# Patient Record
Sex: MALE | Race: WHITE | Employment: FULL TIME | URBAN - NONMETROPOLITAN AREA
[De-identification: names, ages, dates, MRNs, and addresses within clinical notes are randomized per-mention and may not be internally consistent; named-entity substitution may affect disease eponyms.]

---

## 2020-01-01 ENCOUNTER — OFFICE VISIT (OUTPATIENT)
Dept: FAMILY MEDICINE CLINIC | Age: 35
End: 2020-01-01
Payer: MEDICAID

## 2020-01-01 ENCOUNTER — HOSPITAL ENCOUNTER (OUTPATIENT)
Dept: GENERAL RADIOLOGY | Age: 35
Discharge: HOME | End: 2020-11-16
Payer: MEDICAID

## 2020-01-01 VITALS
TEMPERATURE: 98.8 F | WEIGHT: 246 LBS | BODY MASS INDEX: 31.57 KG/M2 | HEART RATE: 109 BPM | RESPIRATION RATE: 20 BRPM | OXYGEN SATURATION: 99 % | HEIGHT: 74 IN | SYSTOLIC BLOOD PRESSURE: 108 MMHG | DIASTOLIC BLOOD PRESSURE: 64 MMHG

## 2020-01-01 VITALS
WEIGHT: 230 LBS | OXYGEN SATURATION: 99 % | BODY MASS INDEX: 28.6 KG/M2 | HEIGHT: 75 IN | RESPIRATION RATE: 20 BRPM | TEMPERATURE: 96.6 F | DIASTOLIC BLOOD PRESSURE: 88 MMHG | SYSTOLIC BLOOD PRESSURE: 138 MMHG | HEART RATE: 76 BPM

## 2020-01-01 VITALS
SYSTOLIC BLOOD PRESSURE: 118 MMHG | OXYGEN SATURATION: 98 % | BODY MASS INDEX: 31.17 KG/M2 | WEIGHT: 256 LBS | TEMPERATURE: 97.1 F | HEIGHT: 76 IN | DIASTOLIC BLOOD PRESSURE: 70 MMHG | HEART RATE: 116 BPM

## 2020-01-01 PROCEDURE — 72100 X-RAY EXAM L-S SPINE 2/3 VWS: CPT

## 2020-01-01 PROCEDURE — 99214 OFFICE O/P EST MOD 30 MIN: CPT | Performed by: FAMILY MEDICINE

## 2020-01-01 PROCEDURE — 80305 DRUG TEST PRSMV DIR OPT OBS: CPT | Performed by: FAMILY MEDICINE

## 2020-01-01 RX ORDER — TRIAMCINOLONE ACETONIDE 40 MG/ML
80 INJECTION, SUSPENSION INTRA-ARTICULAR; INTRAMUSCULAR ONCE
Status: COMPLETED | OUTPATIENT
Start: 2020-01-01 | End: 2020-01-01

## 2020-01-01 RX ORDER — LISINOPRIL 20 MG/1
20 TABLET ORAL DAILY
Qty: 30 TABLET | Refills: 5 | Status: SHIPPED | OUTPATIENT
Start: 2020-01-01

## 2020-01-01 RX ORDER — TIZANIDINE 4 MG/1
4 TABLET ORAL 3 TIMES DAILY
Qty: 45 TABLET | Refills: 0 | Status: SHIPPED | OUTPATIENT
Start: 2020-01-01 | End: 2020-01-01 | Stop reason: ALTCHOICE

## 2020-01-01 RX ORDER — QUETIAPINE FUMARATE 50 MG/1
TABLET, FILM COATED ORAL
Qty: 30 TABLET | Refills: 1 | Status: SHIPPED | OUTPATIENT
Start: 2020-01-01 | End: 2020-01-01 | Stop reason: SDUPTHER

## 2020-01-01 RX ORDER — CLONAZEPAM 1 MG/1
1 TABLET ORAL 3 TIMES DAILY PRN
Qty: 80 TABLET | Refills: 1 | Status: SHIPPED | OUTPATIENT
Start: 2020-01-01 | End: 2020-01-01 | Stop reason: SDUPTHER

## 2020-01-01 RX ORDER — CLONAZEPAM 1 MG/1
1 TABLET ORAL 3 TIMES DAILY PRN
Qty: 80 TABLET | Refills: 2 | Status: SHIPPED | OUTPATIENT
Start: 2020-01-01 | End: 2020-01-01 | Stop reason: SDUPTHER

## 2020-01-01 RX ORDER — DICLOFENAC SODIUM 75 MG/1
75 TABLET, DELAYED RELEASE ORAL 2 TIMES DAILY
Qty: 60 TABLET | Refills: 3 | Status: SHIPPED | OUTPATIENT
Start: 2020-01-01

## 2020-01-01 RX ORDER — QUETIAPINE FUMARATE 50 MG/1
TABLET, FILM COATED ORAL
Qty: 30 TABLET | Refills: 2 | Status: SHIPPED | OUTPATIENT
Start: 2020-01-01 | End: 2020-01-01 | Stop reason: SDUPTHER

## 2020-01-01 RX ORDER — TIZANIDINE 4 MG/1
4 TABLET ORAL 3 TIMES DAILY
Qty: 60 TABLET | Refills: 0 | Status: SHIPPED | OUTPATIENT
Start: 2020-01-01

## 2020-01-01 RX ORDER — CLONAZEPAM 1 MG/1
1 TABLET ORAL 3 TIMES DAILY PRN
Qty: 75 TABLET | Refills: 2 | Status: SHIPPED | OUTPATIENT
Start: 2020-01-01 | End: 2020-01-01

## 2020-01-01 RX ORDER — QUETIAPINE FUMARATE 50 MG/1
TABLET, FILM COATED ORAL
Qty: 30 TABLET | Refills: 2 | Status: SHIPPED | OUTPATIENT
Start: 2020-01-01

## 2020-01-01 RX ORDER — LISINOPRIL 20 MG/1
20 TABLET ORAL DAILY
Qty: 30 TABLET | Refills: 5 | Status: SHIPPED | OUTPATIENT
Start: 2020-01-01 | End: 2020-01-01 | Stop reason: SDUPTHER

## 2020-01-01 RX ADMIN — TRIAMCINOLONE ACETONIDE 80 MG: 40 INJECTION, SUSPENSION INTRA-ARTICULAR; INTRAMUSCULAR at 07:50

## 2020-01-01 ASSESSMENT — ENCOUNTER SYMPTOMS
SHORTNESS OF BREATH: 0
WHEEZING: 0
BACK PAIN: 0
VOMITING: 0
WHEEZING: 0
EYE DISCHARGE: 0
CONSTIPATION: 0
RHINORRHEA: 0
COUGH: 0
DIARRHEA: 0
COUGH: 0
NAUSEA: 0
ABDOMINAL PAIN: 0
BACK PAIN: 1
EYE DISCHARGE: 0
RHINORRHEA: 0
ABDOMINAL PAIN: 0
RHINORRHEA: 0
CONSTIPATION: 0
VOMITING: 0
EYE PAIN: 0
DIARRHEA: 0
NAUSEA: 0
ABDOMINAL PAIN: 0
SHORTNESS OF BREATH: 0
NAUSEA: 0
EYE DISCHARGE: 0
DIARRHEA: 0
EYE PAIN: 0
SHORTNESS OF BREATH: 0
WHEEZING: 0
CONSTIPATION: 0
EYE PAIN: 0
BACK PAIN: 0
VOMITING: 0
COUGH: 0

## 2020-05-13 ENCOUNTER — OFFICE VISIT (OUTPATIENT)
Dept: FAMILY MEDICINE CLINIC | Age: 35
End: 2020-05-13
Payer: MEDICAID

## 2020-05-13 VITALS
DIASTOLIC BLOOD PRESSURE: 94 MMHG | BODY MASS INDEX: 33.24 KG/M2 | RESPIRATION RATE: 20 BRPM | SYSTOLIC BLOOD PRESSURE: 152 MMHG | OXYGEN SATURATION: 99 % | HEIGHT: 76 IN | WEIGHT: 273 LBS | TEMPERATURE: 96.9 F | HEART RATE: 86 BPM

## 2020-05-13 PROCEDURE — 99204 OFFICE O/P NEW MOD 45 MIN: CPT | Performed by: FAMILY MEDICINE

## 2020-05-13 RX ORDER — CLONAZEPAM 1 MG/1
1 TABLET ORAL 3 TIMES DAILY PRN
Qty: 90 TABLET | Refills: 0 | Status: SHIPPED | OUTPATIENT
Start: 2020-05-13 | End: 2020-01-01 | Stop reason: SDUPTHER

## 2020-05-13 RX ORDER — PROPRANOLOL HYDROCHLORIDE 20 MG/1
TABLET ORAL
COMMUNITY
Start: 2020-02-20 | End: 2020-05-13

## 2020-05-13 RX ORDER — QUETIAPINE FUMARATE 50 MG/1
TABLET, FILM COATED ORAL
COMMUNITY
Start: 2020-02-20 | End: 2020-01-01 | Stop reason: SDUPTHER

## 2020-05-13 RX ORDER — LISINOPRIL 20 MG/1
20 TABLET ORAL DAILY
Qty: 30 TABLET | Refills: 1 | Status: SHIPPED | OUTPATIENT
Start: 2020-05-13 | End: 2020-01-01 | Stop reason: SDUPTHER

## 2020-05-13 ASSESSMENT — ENCOUNTER SYMPTOMS
DIARRHEA: 0
WHEEZING: 0
COUGH: 0
BACK PAIN: 0
SHORTNESS OF BREATH: 0
NAUSEA: 0
ABDOMINAL PAIN: 0
EYE DISCHARGE: 0
VOMITING: 0
CONSTIPATION: 0
RHINORRHEA: 0
EYE PAIN: 0

## 2020-05-13 ASSESSMENT — PATIENT HEALTH QUESTIONNAIRE - PHQ9
2. FEELING DOWN, DEPRESSED OR HOPELESS: 0
SUM OF ALL RESPONSES TO PHQ QUESTIONS 1-9: 0
1. LITTLE INTEREST OR PLEASURE IN DOING THINGS: 0
SUM OF ALL RESPONSES TO PHQ QUESTIONS 1-9: 0
SUM OF ALL RESPONSES TO PHQ9 QUESTIONS 1 & 2: 0

## 2020-05-13 NOTE — PROGRESS NOTES
Sekou Lucas is a 36 y.o. male who presents today for   Chief Complaint   Patient presents with    New Patient     to establish care . blood pressure and anxiety . takes inderal but it makes him sleepy , wore out        Chief Complaint: Establish care    HPI  Patient reports that he has had issues with anxiety for several years and states that for a while he was able to just deal with his anxiety but more recently has been having more problems. He states that in years past he was on Klonopin and was doing fairly well with him but was taken off of them by his previous PCP and replaced with other medications. He states that the other medications were not really providing him much benefit however he had reached the point where he was doing better and was no longer needing the medicine as he was able to do with it but more recently has been having a lot more problems with his anxiety and has found it more difficult to function and do the things that he needs to do. He states that he has been on other medications in the past but Klonopin seemed to do the best for him. He states that he was on Xanax for a little while but he seemed to think that that affected his memory. He was on BuSpar but that made him feel off and a little out of his head and did not like the way it made him feel. He also states that he has been on Paxil and Wellbutrin but neither of those really did well for him. He also has a history of arterial hypertension and is currently taking propranolol which she reports has done reasonably well for his blood pressure but makes him feel horrible. He states that he feels fatigued and wiped out when he takes it and for that reason he finds himself not taking it some because he cannot afford to fill that way. He states that the only other thing that he is ever been on was metoprolol and reports that just did not do as well for his blood pressure.   He denies any issues with his heart rate, issues with tablet Take 1 tablet by mouth daily 30 tablet 1    clonazePAM (KLONOPIN) 1 MG tablet Take 1 tablet by mouth 3 times daily as needed for Anxiety for up to 30 days. 90 tablet 0    ibuprofen (ADVIL;MOTRIN) 200 MG tablet Take 200 mg by mouth every 6 hours as needed for Pain. No current facility-administered medications for this visit. No Known Allergies    Past Surgical History:   Procedure Laterality Date    FACIAL RECONSTRUCTION SURGERY      TONSILLECTOMY         Social History     Tobacco Use    Smoking status: Current Every Day Smoker     Packs/day: 1.00     Types: Cigarettes     Start date: 2006    Smokeless tobacco: Never Used   Substance Use Topics    Alcohol use: No    Drug use: No       History reviewed. No pertinent family history. BP (!) 152/94 (Site: Right Upper Arm, Position: Sitting, Cuff Size: Large Adult) Comment: did not take medication today  Pulse 86   Temp 96.9 °F (36.1 °C) (Oral)   Resp 20   Ht 6' 4\" (1.93 m)   Wt 273 lb (123.8 kg)   SpO2 99%   BMI 33.23 kg/m²     Physical Exam  Vitals signs and nursing note reviewed. Constitutional:       General: He is not in acute distress. Appearance: Normal appearance. He is well-developed. He is not diaphoretic. HENT:      Head: Normocephalic and atraumatic. Right Ear: External ear normal.      Left Ear: External ear normal.      Nose:      Comments: Mask in place     Mouth/Throat:      Comments: Mask in place  Eyes:      General: No scleral icterus. Right eye: No discharge. Left eye: No discharge. Conjunctiva/sclera: Conjunctivae normal.   Neck:      Musculoskeletal: Normal range of motion and neck supple. No muscular tenderness. Trachea: No tracheal deviation. Cardiovascular:      Rate and Rhythm: Normal rate and regular rhythm. Heart sounds: Normal heart sounds. No murmur. No friction rub. No gallop. Pulmonary:      Effort: Pulmonary effort is normal. No respiratory distress. and the possibility of further adjustments moving forward. Discussed potential side effects with the medicine. We will continue to monitor and adjust course dictates. 4. Disturbance in sleep behavior G47.9  with patient doing well with Seroquel. We continue at this time and will continue to monitor. Discussed potential side effects with Seroquel and discussed the possibility of trials of alternative medications in the future once we have a better grasp on his anxiety and blood pressure to determine if there are other options I could do well for him that may not have some of the similar type side effects of Seroquel can. We will continue with the Seroquel and continue to monitor and further discuss at future visits. Plan:  Discussed proper use of medication. Discussed signs and symptoms requiring medical attention. All questions were answered and patient voiced understanding and agreement with plan as discussed. No orders of the defined types were placed in this encounter. Orders Placed This Encounter   Medications    lisinopril (PRINIVIL;ZESTRIL) 20 MG tablet     Sig: Take 1 tablet by mouth daily     Dispense:  30 tablet     Refill:  1    clonazePAM (KLONOPIN) 1 MG tablet     Sig: Take 1 tablet by mouth 3 times daily as needed for Anxiety for up to 30 days. Dispense:  90 tablet     Refill:  0     Medications Discontinued During This Encounter   Medication Reason    propranolol (INDERAL) 20 MG tablet      Patient Instructions   Patient given educational handouts and has had all questions answered. Patient voices understanding and agrees to plans along with risks and benefits of plan. Patient is instructed to continue prior meds,diet, and exercise plans as instructed. Patient agrees to follow up as instructed and sooner if needed. Patient agrees to go to ER if condition becomes emergent. Return in about 4 weeks (around 6/10/2020), or if symptoms worsen or fail to improve.

## 2020-06-11 NOTE — PROGRESS NOTES
Wendy Will is a 36 y.o. male who presents today for   Chief Complaint   Patient presents with    1 Month Follow-Up     tried new BP med     Anxiety    Hypertension       Chief Complaint: Anxiety    HPI  Patient has a history of anxiety and panic attacks and recently was started on trial of Klonopin. He reports that he did well with the medicine and denies any problems with his use. He states it has been helpful at controlling his anxiety without causing any problems. He states that without the medicine he has significant issues with his anxiety that interferes with his daily life. He does have a history of insomnia and has been doing well with his current use of Seroquel. He denies any issues with use of medicine denies any recent changes to his sleep. He also has essential hypertension and has been doing well with his blood pressure with use of lisinopril. He denies any issues with use of medicine or any symptoms from abnormal blood pressures. He does attempt to avoid excess salt intake. Review of Systems   Constitutional: Negative for activity change, appetite change, fatigue and fever. HENT: Negative for congestion and rhinorrhea. Eyes: Negative for pain and discharge. Respiratory: Negative for cough, shortness of breath and wheezing. Cardiovascular: Negative for chest pain and palpitations. Gastrointestinal: Negative for abdominal pain, constipation, diarrhea, nausea and vomiting. Endocrine: Negative for cold intolerance and heat intolerance. Genitourinary: Negative for dysuria and hematuria. Musculoskeletal: Positive for arthralgias. Negative for back pain, gait problem and neck pain. Skin: Negative for rash and wound. Neurological: Negative for syncope and weakness. Hematological: Negative for adenopathy. Does not bruise/bleed easily. Psychiatric/Behavioral: Positive for sleep disturbance. Negative for dysphoric mood. The patient is nervous/anxious.         Past Medical History:   Diagnosis Date    Anxiety     Hypertension        Current Outpatient Medications   Medication Sig Dispense Refill    clonazePAM (KLONOPIN) 1 MG tablet Take 1 tablet by mouth 3 times daily as needed for Anxiety for up to 30 days. 80 tablet 1    QUEtiapine (SEROQUEL) 50 MG tablet TAKE ONE TABLET BY MOUTH AT BEDTIME 30 tablet 1    lisinopril (PRINIVIL;ZESTRIL) 20 MG tablet Take 1 tablet by mouth daily 30 tablet 5    ibuprofen (ADVIL;MOTRIN) 200 MG tablet Take 200 mg by mouth every 6 hours as needed for Pain. No current facility-administered medications for this visit. No Known Allergies    Past Surgical History:   Procedure Laterality Date    FACIAL RECONSTRUCTION SURGERY      TONSILLECTOMY         Social History     Tobacco Use    Smoking status: Current Every Day Smoker     Packs/day: 1.00     Types: Cigarettes     Start date: 2006    Smokeless tobacco: Never Used   Substance Use Topics    Alcohol use: No    Drug use: No       No family history on file. /70 (Site: Right Upper Arm, Position: Sitting, Cuff Size: Large Adult)   Pulse 116   Temp 97.1 °F (36.2 °C) (Temporal)   Ht 6' 4\" (1.93 m)   Wt 256 lb (116.1 kg)   SpO2 98%   BMI 31.16 kg/m²     Physical Exam  Vitals signs and nursing note reviewed. Constitutional:       General: He is not in acute distress. Appearance: Normal appearance. He is well-developed. He is not diaphoretic. HENT:      Head: Normocephalic and atraumatic. Right Ear: External ear normal.      Left Ear: External ear normal.      Nose:      Comments: Mask in place     Mouth/Throat:      Comments: Mask in place  Eyes:      General: No scleral icterus. Right eye: No discharge. Left eye: No discharge. Conjunctiva/sclera: Conjunctivae normal.   Neck:      Musculoskeletal: Normal range of motion and neck supple. No muscular tenderness. Trachea: No tracheal deviation.    Cardiovascular:      Rate and Rhythm: Normal rate and regular rhythm. Heart sounds: Normal heart sounds. No murmur. No friction rub. No gallop. Pulmonary:      Effort: Pulmonary effort is normal. No respiratory distress. Breath sounds: Normal breath sounds. No wheezing or rales. Abdominal:      General: Bowel sounds are normal. There is no distension. Palpations: Abdomen is soft. Tenderness: There is no abdominal tenderness. Musculoskeletal:         General: No deformity (No gross deformities of upper or lower extremities) or signs of injury. Right lower leg: No edema. Left lower leg: No edema. Lymphadenopathy:      Cervical: No cervical adenopathy. Skin:     General: Skin is warm and dry. Findings: No erythema or rash. Neurological:      Mental Status: He is alert and oriented to person, place, and time. Cranial Nerves: No cranial nerve deficit. Psychiatric:         Behavior: Behavior normal.         Thought Content: Thought content normal.         Assessment:       ICD-10-CM    1. Anxiety F41.9 clonazePAM (KLONOPIN) 1 MG tablet   2. Panic attacks F41.0 clonazePAM (KLONOPIN) 1 MG tablet    Symptomatic improvement with use of Klonopin. Stressed importance of as needed use only. We will continue to monitor with efforts of reducing dose to the lowest possible amount to control his symptoms. 3. Insomnia, unspecified type G47.00 QUEtiapine (SEROQUEL) 50 MG tablet    Doing well with current use of Seroquel. Will continue continue to monitor. 4. Essential hypertension I10 lisinopril (PRINIVIL;ZESTRIL) 20 MG tablet    Controlled with current medication. We will continue to monitor and adjust course dictates. Plan:  Discussed proper use of medication. Discussed signs and symptoms requiring medical attention. All questions were answered and patient voiced understanding and agreement with plan as discussed. No orders of the defined types were placed in this encounter.     Orders Placed This Encounter   Medications    clonazePAM (KLONOPIN) 1 MG tablet     Sig: Take 1 tablet by mouth 3 times daily as needed for Anxiety for up to 30 days. Dispense:  80 tablet     Refill:  1    QUEtiapine (SEROQUEL) 50 MG tablet     Sig: TAKE ONE TABLET BY MOUTH AT BEDTIME     Dispense:  30 tablet     Refill:  1    lisinopril (PRINIVIL;ZESTRIL) 20 MG tablet     Sig: Take 1 tablet by mouth daily     Dispense:  30 tablet     Refill:  5     Medications Discontinued During This Encounter   Medication Reason    clonazePAM (KLONOPIN) 1 MG tablet REORDER    QUEtiapine (SEROQUEL) 50 MG tablet REORDER    lisinopril (PRINIVIL;ZESTRIL) 20 MG tablet REORDER     Patient Instructions   Patient given educational handouts and has had all questions answered. Patient voices understanding and agrees to plans along with risks and benefits of plan. Patient is instructed to continue prior meds,diet, and exercise plans as instructed. Patient agrees to follow up as instructed and sooner if needed. Patient agrees to go to ER if condition becomes emergent. Return in about 2 months (around 8/11/2020).

## 2020-07-07 NOTE — PATIENT INSTRUCTIONS
social groups, or volunteer to help others. Being alone sometimes makes things seem worse than they are. · Get at least 30 minutes of exercise on most days of the week to relieve stress. Walking is a good choice. You also may want to do other activities, such as running, swimming, cycling, or playing tennis or team sports. Relaxation techniques  Do relaxation exercises 10 to 20 minutes a day. You can play soothing, relaxing music while you do them, if you wish. · Tell others in your house that you are going to do your relaxation exercises. Ask them not to disturb you. · Find a comfortable place, away from all distractions and noise. · Lie down on your back, or sit with your back straight. · Focus on your breathing. Make it slow and steady. · Breathe in through your nose. Breathe out through either your nose or mouth. · Breathe deeply, filling up the area between your navel and your rib cage. Breathe so that your belly goes up and down. · Do not hold your breath. · Breathe like this for 5 to 10 minutes. Notice the feeling of calmness throughout your whole body. As you continue to breathe slowly and deeply, relax by doing the following for another 5 to 10 minutes:  · Tighten and relax each muscle group in your body. You can begin at your toes and work your way up to your head. · Imagine your muscle groups relaxing and becoming heavy. · Empty your mind of all thoughts. · Let yourself relax more and more deeply. · Become aware of the state of calmness that surrounds you. · When your relaxation time is over, you can bring yourself back to alertness by moving your fingers and toes and then your hands and feet and then stretching and moving your entire body. Sometimes people fall asleep during relaxation, but they usually wake up shortly afterward. · Always give yourself time to return to full alertness before you drive a car or do anything that might cause an accident if you are not fully alert.  Never play a relaxation tape while you drive a car. When should you call for help? [id number] anytime you think you may need emergency care. For example, call if:  · You feel you cannot stop from hurting yourself or someone else. Keep the numbers for these national suicide hotlines: 1-465-566-TALK (5-578.866.7184) and 1-731-TZYXMJD (8-911.641.7969). If you or someone you know talks about suicide or feeling hopeless, get help right away. Watch closely for changes in your health, and be sure to contact your doctor if:  · You have anxiety or fear that affects your life. · You have symptoms of anxiety that are new or different from those you had before. Where can you learn more? Go to https://[employer]. org and sign in to your [employer] account. Enter P754 in the Xeebel box to learn more about \"Anxiety Disorder: Care Instructions. \"     If you do not have an account, please click on the \"Sign Up Now\" link. Current as of: January 31, 2020               Content Version: 12.5  © 3389-7552 [employer]. Care instructions adapted under license by [facility] ([facility]). If you have questions about a medical condition or this instruction, always ask your healthcare professional. [address] any warranty or liability for your use of this information.

## 2020-08-13 NOTE — PATIENT INSTRUCTIONS

## 2020-08-13 NOTE — LETTER
MEDICATION AGREEMENT     [facility]  [birth year]26      For certain conditions, multiple classes of medications may be used to help better manage your symptoms, and to improve your ability to function at home, work and in social settings. However, these medications do have risks, which will be discussed with you, including addiction and dependency. The following prescribed medications need frequent monitoring and will require you to partner and assist in your healthcare. Medication  Dose, instructions and quantity as indicated on current prescription bottle Diagnosis/Reason(s) for Taking Category     Klonopin 1 mg 1 tid prn #80 1 refill Anxiety                             Benefits and goals of Controlled Substance Medications: There are two potential goals for your treatment: (1) decreased pain and suffering (2) improved daily life functions. There are many possible treatments for your chronic condition(s), and, in addition to controlled substance medications, we will try alternatives such as physical therapy, yoga, massage, home daily exercise, meditation, relaxation techniques, injections, chiropractic manipulations, surgery, cognitive therapy, hypnosis and many medications that are not habit-forming. Use of controlled substance medications may be helpful, but they are unlikely to resolve all of your symptoms or restore all function. Risks of Controlled Substance Medications:    Opioid pain medications: These medications can lead to problems such as addiction/dependence, sedation, lightheadedness/dizziness, memory issues, falls, constipation, nausea, or vomiting. They may also impair the ability to drive or operate machinery. Additionally, these medications may lower testosterone levels, leading to loss of bone strength, stamina and sex drive.   They may cause problems with breathing, sleep apnea and reduced coughing, which are especially dangerous for patients with lung disease. Overdose or dangerous interactions with alcohol and other medications may occur, leading to death. Hyperalgesia may develop, in which patients receiving opioids for the treatment of pain may actually become more sensitive to certain painful stimuli, and in some cases, experience pain from ordinarily non-painful stimuli. Women between the ages of 14-53 who could become pregnant should carefully weigh the risks and benefits of opioids with their physicians, as these medications increase the risk of pregnancy complications, including miscarriage,  delivery and stillbirth. It is also possible for babies to be born addicted to opioids. Opioid dependence withdrawal symptoms may include; feelings of uneasiness, increased pain, irritability, belly pain, diarrhea, sweats and goose-flesh. Benzodiazepines and non-benzodiazepine sleep medications: These medications can lead to problems such as addiction/dependence, sedation, fatigue, lightheadedness, dizziness, incoordination, falls, depression, hallucinations, and impaired judgment, memory and concentration. The ability to drive and operate machinery may also be affected. Abnormal sleep-related behaviors have been reported, including sleep walking, driving, making telephone calls, eating, or having sex while not fully awake. These medications can suppress breathing and worsen sleep apnea, particularly when combined with alcohol or other sedating medications, potentially leading to death. Dependence withdrawal symptoms may include tremors, anxiety, hallucinations and seizures. Stimulants:  Common adverse effects include addiction/dependence, increased blood pressure and heart rate, decreased appetite, nausea, involuntary weight loss, insomnia, irritability, and headaches.   These risks may increase when these medications are combined with other stimulants, such as caffeine pills or energy drinks, certain weight loss supplements and oral decongestants. Dependence withdrawal symptoms may include depressed mood, loss of interest, suicidal thoughts, anxiety, fatigue, appetite changes and agitation. Testosterone replacement therapy:  Potential side effects include increased risk of stroke and heart attack, blood clots, increased blood pressure, increased cholesterol, enlarged prostate, sleep apnea, irritability/aggression and other mood disorders, and decreased fertility. Other:     1. I understand that I have the following responsibilities:  · I will take medications at the dose and frequency prescribed. · I will not increase or change how I take my medications without the approval of the health care provider who signs this Medication Agreement. · I will arrange for refills at the prescribed interval ONLY during regular office hours. I will not ask for refills earlier than agreed, after-hours, on holidays or on weekends. · I will obtain all refills for these medications at  ·  ____________________________________  pharmacy (phone number  ·  ________________________), with full consent for my provider and pharmacist to exchange information in writing or verbally. · I will not request any pain medications or controlled substances from other providers and will inform this provider of all other medications I am taking. · I will inform my other health care providers that I am taking these medications and of the existence of this [address]. In the event of an emergency, I will provide the same information to the emergency department providers. · I will protect my prescriptions and medications. I understand that lost or misplaced prescriptions will not be replaced. · I will keep medications only for my own use and will not share them with others. I will keep all medications away from children. · I agree to participate in any medical, psychological or psychiatric assessments recommended by my provider. · I will actively participate in any program designed to improve function, including social, physical, psychological and daily or work activities. 2. I will not use illegal or street drugs or another person's prescription. If I have an addiction problem with drugs or alcohol and my provider asks me to enter a program to address this issue, I agree to follow through. Such programs may include:  · 12-Step program and securing a sponsor  · Individual counseling   · Inpatient or outpatient treatment  · Other:_____________________________________________________________________________________________________________________________________________    If in treatment, I will request that a copy of the programs initial evaluation and treatment recommendations be sent to this provider and will not expect refills until that is received. I will also request written monthly updates be sent to this provider to verify my continuing treatment. 3. I will consent to drug screening upon my providers request to assure I am only taking the prescribed drugs, described in this MEDICATION AGREEMENT. I understand that a drug screen is a laboratory test in which a sample of my urine, blood or saliva is checked to see what drugs I have been taking. 4. I agree that I will treat the providers and staff at this office with respect at all times. I will keep all of my scheduled appointments, but if I need to cancel my appointment, I will do so a minimum of 24 hours before it is scheduled. 5. I understand that this provider may stop prescribing the medications listed if:  · I do not show any improvement in pain, or my activity has not improved. · I develop rapid tolerance or loss of improvement, as described in my treatment plan. · I develop significant side effects from the medication.   · My behavior is inconsistent with the responsibilities outlined above, which may also result in my being prevented from receiving further care from this office. · Other:____________________________________________________________________    AGREEMENT:    I have read the above and have had all of my questions answered. For chronic disease management, I know that my symptoms can be managed with many types of treatments. A chronic medication trial may be part of my treatment, but I must be an active participant in my care. Medication therapy is only one part of my symptom management plan. In some cases, there may be limited scientific evidence to support the chronic use of certain medications to improve symptoms and daily function. Furthermore, in certain circumstances, there may be scientific information that suggests that use of chronic controlled substances may actually worsen my symptoms and increase my risk of unintentional death directly related to this medication therapy. I know that if my provider feels my risk from controlled medications is greater than my benefit, I will have my controlled substance medication(s) compassionately lowered or removed altogether. I agree to a controlled substance medication trial.      I further agree to allow this office to contact my HIPAA contact on file if there are concerns about my safety and use of controlled medications. I have agreed to use the following medications above as instructed by my physician and as stated in this [address].      Patient Signature:  ______________________  Date:8/13/2020 or _____________    Provider Signature:______________________  Date:8/13/2020 or _____________

## 2020-08-13 NOTE — PROGRESS NOTES
Suzette Harrington is a 36 y.o. male who presents today for   Chief Complaint   Patient presents with    Other     2 month follow up    Medication Refill     last taking this am    Anxiety    Depression    Hypertension       Chief Complaint: follow up    HPI  Has a new job and is doing well and enjoying it but is working a lot. He does have a history of anxiety and panic attacks and is doing well with his use of klonopin. He states that he has not had any issues with the medicine and trying to be very diligent at avoid regular use but is still taking about 2 per day and some days 3 per day. Blood pressure doing well with the lisinopril. He denies any issues with the medicine or any symptoms from abnormal blood pressures. He does attempt to avoid excess salt intake. Seroquel continues to do well for him. He states that he is sleeping well. He states that with the new job he has been sleeping better and at times has been able to cut back on the use of Seroquel or take a half but states that it still helpful and denies any problems with the medicine. He does report that he is been having some right upper back/shoulder pain around his scapula. He reports that he is been given problems for years but with his new job has aggravated some. He is managing but denies any specific aggravating or relieving factors for symptoms. He is not been do anything in efforts to improve his symptoms. Review of Systems   Constitutional: Negative for activity change, appetite change, fatigue and fever. HENT: Negative for congestion and rhinorrhea. Eyes: Negative for pain and discharge. Respiratory: Negative for cough, shortness of breath and wheezing. Cardiovascular: Negative for chest pain and palpitations. Gastrointestinal: Negative for abdominal pain, constipation, diarrhea, nausea and vomiting. Endocrine: Negative for cold intolerance and heat intolerance.    Genitourinary: Negative for dysuria and hematuria. Musculoskeletal: Positive for arthralgias. Negative for back pain, gait problem and neck pain. Skin: Negative for rash and wound. Neurological: Negative for syncope and weakness. Hematological: Negative for adenopathy. Does not bruise/bleed easily. Psychiatric/Behavioral: Positive for sleep disturbance. Negative for dysphoric mood. The patient is nervous/anxious. Past Medical History:   Diagnosis Date    Anxiety     Hypertension        Current Outpatient Medications   Medication Sig Dispense Refill    clonazePAM (KLONOPIN) 1 MG tablet Take 1 tablet by mouth 3 times daily as needed for Anxiety for up to 30 days. 80 tablet 2    QUEtiapine (SEROQUEL) 50 MG tablet TAKE ONE TABLET BY MOUTH AT BEDTIME 30 tablet 2    lisinopril (PRINIVIL;ZESTRIL) 20 MG tablet Take 1 tablet by mouth daily 30 tablet 5    tiZANidine (ZANAFLEX) 4 MG tablet Take 1 tablet by mouth 3 times daily 45 tablet 0    ibuprofen (ADVIL;MOTRIN) 200 MG tablet Take 200 mg by mouth every 6 hours as needed for Pain. No current facility-administered medications for this visit. No Known Allergies    Past Surgical History:   Procedure Laterality Date    FACIAL RECONSTRUCTION SURGERY      TONSILLECTOMY         Social History     Tobacco Use    Smoking status: Current Every Day Smoker     Packs/day: 1.00     Types: Cigarettes     Start date: 2006    Smokeless tobacco: Never Used   Substance Use Topics    Alcohol use: No    Drug use: No       No family history on file. /64 (Site: Right Upper Arm, Position: Sitting, Cuff Size: Large Adult)   Pulse 109   Temp 98.8 °F (37.1 °C) (Oral)   Resp 20   Ht 6' 2\" (1.88 m)   Wt 246 lb (111.6 kg)   SpO2 99%   BMI 31.58 kg/m²     Physical Exam  Vitals signs and nursing note reviewed. Constitutional:       General: He is not in acute distress. Appearance: Normal appearance. He is well-developed. He is not diaphoretic.    HENT:      Head: Normocephalic and attacks  F41.0 clonazePAM (KLONOPIN) 1 MG tablet     POCT Rapid Drug Screen   3. Insomnia, unspecified type  G47.00 QUEtiapine (SEROQUEL) 50 MG tablet     Doing well with current use of Seroquel. Will continue continue to monitor. 4. Essential hypertension  I10 lisinopril (PRINIVIL;ZESTRIL) 20 MG tablet    Controlled current use of lisinopril. Will continue at this time and adjust the course dictates. 5. Muscle spasm  M62.838 tiZANidine (ZANAFLEX) 4 MG tablet    Discussed physical exam findings and suggestive diagnosis. Discussed possibility of a trial of muscle relaxer in efforts to improve his symptoms. Discussed the importance of ice/heat, rest, and stretches/exercises. We will continue to monitor with further treatment and work-up as course dictates. Plan:  Discussed proper use of medication. Discussed signs and symptoms requiring medical attention. All questions were answered and patient voiced understanding and agreement with plan as discussed. Orders Placed This Encounter   Procedures    POCT Rapid Drug Screen     Orders Placed This Encounter   Medications    clonazePAM (KLONOPIN) 1 MG tablet     Sig: Take 1 tablet by mouth 3 times daily as needed for Anxiety for up to 30 days.      Dispense:  80 tablet     Refill:  2    QUEtiapine (SEROQUEL) 50 MG tablet     Sig: TAKE ONE TABLET BY MOUTH AT BEDTIME     Dispense:  30 tablet     Refill:  2    lisinopril (PRINIVIL;ZESTRIL) 20 MG tablet     Sig: Take 1 tablet by mouth daily     Dispense:  30 tablet     Refill:  5    tiZANidine (ZANAFLEX) 4 MG tablet     Sig: Take 1 tablet by mouth 3 times daily     Dispense:  45 tablet     Refill:  0     Medications Discontinued During This Encounter   Medication Reason    clonazePAM (KLONOPIN) 1 MG tablet REORDER    QUEtiapine (SEROQUEL) 50 MG tablet REORDER    lisinopril (PRINIVIL;ZESTRIL) 20 MG tablet REORDER     Patient Instructions   Patient given educational handouts and has had all questions answered. Patient voices understanding and agrees to plans along with risks and benefits of plan. Patient is instructed to continue prior meds,diet, and exercise plans as instructed. Patient agrees to follow up as instructed and sooner if needed. Patient agrees to go to ER if condition becomes emergent. Return if symptoms worsen or fail to improve, for next scheduled follow up with PCP.

## 2020-11-16 NOTE — PATIENT INSTRUCTIONS
Patient Education        Learning About How to Have a Healthy Back  What causes back pain? Back pain is often caused by overuse, strain, or injury. For example, people often hurt their backs playing sports or working in the yard, being jolted in a car accident, or lifting something too heavy. Aging plays a part too. Your bones and muscles tend to lose strength as you age, which makes injury more likely. The spongy discs between the bones of the spine (vertebrae) may suffer from wear and tear and no longer provide enough cushion between the bones. A disc that bulges or breaks open (herniated disc) can press on nerves, causing back pain. In some people, back pain is the result of arthritis, broken vertebrae caused by bone loss (osteoporosis), illness, or a spine problem. Although most people have back pain at one time or another, there are steps you can take to make it less likely. How can you have a healthy back? Reduce stress on your back through good posture   Slumping or slouching alone may not cause low back pain. But after the back has been strained or injured, bad posture can make pain worse. · Sleep in a position that maintains your back's normal curves and on a mattress that feels comfortable. Sleep on your side with a pillow between your knees, or sleep on your back with a pillow under your knees. These positions can reduce strain on your back. · Stand and sit up straight. \"Good posture\" generally means your ears, shoulders, and hips are in a straight line. · If you must stand for a long time, put one foot on a stool, ledge, or box. Switch feet every now and then. · Sit in a chair that is low enough to let you place both feet flat on the floor with both knees nearly level with your hips. If your chair or desk is too high, use a footrest to raise your knees. Place a small pillow, a rolled-up towel, or a lumbar roll in the curve of your back if you need extra support.   · Try a kneeling chair, which helps tilt your hips forward. This takes pressure off your lower back. · Try sitting on an exercise ball. It can rock from side to side, which helps keep your back loose. · When driving, keep your knees nearly level with your hips. Sit straight, and drive with both hands on the steering wheel. Your arms should be in a slightly bent position. Reduce stress on your back through careful lifting   · Squat down, bending at the hips and knees only. If you need to, put one knee to the floor and extend your other knee in front of you, bent at a right angle (half kneeling). · Press your chest straight forward. This helps keep your upper back straight while keeping a slight arch in your low back. · Hold the load as close to your body as possible, at the level of your belly button (navel). · Use your feet to change direction, taking small steps. · Lead with your hips as you change direction. Keep your shoulders in line with your hips as you move. · Set down your load carefully, squatting with your knees and hips only. Exercise and stretch your back   · Do some exercise on most days of the week, if your doctor says it is okay. You can walk, run, swim, or cycle. · Stretch your back muscles. Here are a few exercises to try:  ? Lie on your back, and gently pull one bent knee to your chest. Put that foot back on the floor, and then pull the other knee to your chest.  ? Do pelvic tilts. Lie on your back with your knees bent. Tighten your stomach muscles. Pull your belly button (navel) in and up toward your ribs. You should feel like your back is pressing to the floor and your hips and pelvis are slightly lifting off the floor. Hold for 6 seconds while breathing smoothly. ? Sit with your back flat against a wall. · Keep your core muscles strong. The muscles of your back, belly (abdomen), and buttocks support your spine. ? Pull in your belly and imagine pulling your navel toward your spine.  Hold this for 6 seconds, then relax. Remember to keep breathing normally as you tense your muscles. ? Do curl-ups. Always do them with your knees bent. Keep your low back on the floor, and curl your shoulders toward your knees using a smooth, slow motion. Keep your arms folded across your chest. If this bothers your neck, try putting your hands behind your neck (not your head), with your elbows spread apart. ? Lie on your back with your knees bent and your feet flat on the floor. Tighten your belly muscles, and then push with your feet and raise your buttocks up a few inches. Hold this position 6 seconds as you continue to breathe normally, then lower yourself slowly to the floor. Repeat 8 to 12 times. ? If you like group exercise, try Pilates or yoga. These classes have poses that strengthen the core muscles. Lead a healthy lifestyle   · Stay at a healthy weight to avoid strain on your back. · Do not smoke. Smoking increases the risk of osteoporosis, which weakens the spine. If you need help quitting, talk to your doctor about stop-smoking programs and medicines. These can increase your chances of quitting for good. Where can you learn more? Go to https://[employer].yetu. org and sign in to your [employer] account. Enter L315 in the Capton box to learn more about \"Learning About How to Have a Healthy Back. \"     If you do not have an account, please click on the \"Sign Up Now\" link. Current as of: March 2, 2020               Content Version: 12.6  © 2006-2020 [employer]. Care instructions adapted under license by [facility] ([facility]). If you have questions about a medical condition or this instruction, always ask your healthcare professional. [place] [address] any warranty or liability for your use of this information.

## 2020-11-16 NOTE — PROGRESS NOTES
General: He is not in acute distress. Appearance: Normal appearance. He is well-developed. He is not diaphoretic. HENT:      Head: Normocephalic and atraumatic. Right Ear: External ear normal.      Left Ear: External ear normal.      Mouth/Throat:      Comments: Mask in place  Eyes:      General: No scleral icterus. Right eye: No discharge. Left eye: No discharge. Conjunctiva/sclera: Conjunctivae normal.   Neck:      Musculoskeletal: Normal range of motion and neck supple. No muscular tenderness. Trachea: No tracheal deviation. Cardiovascular:      Rate and Rhythm: Normal rate and regular rhythm. Heart sounds: Normal heart sounds. No murmur. No friction rub. No gallop. Pulmonary:      Effort: Pulmonary effort is normal. No respiratory distress. Breath sounds: Normal breath sounds. No wheezing or rales. Abdominal:      General: Bowel sounds are normal. There is no distension. Palpations: Abdomen is soft. Tenderness: There is no abdominal tenderness. Musculoskeletal:         General: No tenderness, deformity (No gross deformities of upper or lower extremities) or signs of injury. Right lower leg: No edema. Left lower leg: No edema. Comments: Decreased back range of motion with potentially some muscle spasms noted. Lymphadenopathy:      Cervical: No cervical adenopathy. Skin:     General: Skin is warm and dry. Findings: No erythema or rash. Neurological:      Mental Status: He is alert and oriented to person, place, and time. Cranial Nerves: No cranial nerve deficit. Psychiatric:         Behavior: Behavior normal.         Thought Content: Thought content normal.         Assessment:       ICD-10-CM    1.  Acute low back pain with right-sided sciatica, unspecified back pain laterality  M54.41 XR LUMBAR SPINE (2-3 VIEWS)   2. Back strain, initial encounter  S39.012A tiZANidine (ZANAFLEX) 4 MG tablet     diclofenac (VOLTAREN) 75 MG EC tablet     triamcinolone acetonide (KENALOG-40) injection 80 mg   3. Muscle spasm  M62.838 tiZANidine (ZANAFLEX) 4 MG tablet   4. Anxiety  F41.9 clonazePAM (KLONOPIN) 1 MG tablet    Doing well with current use of Klonopin. Will continue at this time continue to monitor and adjust the course dictates. 5. Panic attacks  F41.0 clonazePAM (KLONOPIN) 1 MG tablet   6. Insomnia, unspecified type  G47.00 QUEtiapine (SEROQUEL) 50 MG tablet    Symptomatic improvement with use of Seroquel. Will continue and continue to monitor. 7. Essential hypertension  I10 lisinopril (PRINIVIL;ZESTRIL) 20 MG tablet    Controlled with current medication. We will continue to monitor and adjust course dictates. Plan:  Discussed suggestive diagnosis, x-ray for further evaluation, expected course, and proper use of medication, including OTC medications if prescription is too expensive or insurance does not cover. Discussed the possible need for further imaging if symptoms continue depending on x-ray results. Discussed possibility of specialty involvement if continued or worsening symptoms. Discussed signs and symptoms requiring medical attention. All questions were answered and patient voiced understanding and agreement with plan as discussed. Orders Placed This Encounter   Procedures    XR LUMBAR SPINE (2-3 VIEWS)     Standing Status:   Future     Number of Occurrences:   1     Standing Expiration Date:   11/16/2021     Order Specific Question:   Reason for exam:     Answer:   back pain     Orders Placed This Encounter   Medications    clonazePAM (KLONOPIN) 1 MG tablet     Sig: Take 1 tablet by mouth 3 times daily as needed for Anxiety for up to 30 days.      Dispense:  75 tablet     Refill:  2    QUEtiapine (SEROQUEL) 50 MG tablet     Sig: TAKE ONE TABLET BY MOUTH AT BEDTIME     Dispense:  30 tablet     Refill:  2    lisinopril (PRINIVIL;ZESTRIL) 20 MG tablet     Sig: Take 1 tablet by mouth daily     Dispense:

## 2021-01-01 ENCOUNTER — TELEPHONE (OUTPATIENT)
Dept: FAMILY MEDICINE CLINIC | Age: 36
End: 2021-01-01